# Patient Record
Sex: MALE | Race: ASIAN
[De-identification: names, ages, dates, MRNs, and addresses within clinical notes are randomized per-mention and may not be internally consistent; named-entity substitution may affect disease eponyms.]

---

## 2018-06-13 NOTE — NUR
JAGRUTI FROM HOME C/O ABD PAIN, N/V, ADMITS TO ETOH. PT WAS D/C FROM [place] FOR 
EOTH. PT VSS BUT MOANING AND RESTLESS WITH PAIN 7/10. SKIN IS INTACT BUT RED 
AND FLUSHED ON FACE AND ARMS. A/OX4 ABLE TO MAKE NEEDS KNOWN. WILL CONTINUE TO 
MONITOR FOR ANY CHANGES DURING THE SHIFT.

## 2018-06-13 NOTE — NUR
PT STATES "THE MEDICAITONS YOU GAVE ME ARE MAKING ME FEEL BETTER". ALSO 
SPEAKING ABOUT HIS HX HE STATES "I RELAPSDE ON ALCHOL. A WHILE AGO I WAS AN 
ALCOHOLIC AND DRANK TO THE POINT WHERE MY LIVER FAILED AND WAS WAITING FOR A 
NEW ONE BUT THEN IT HEALED."

## 2019-09-09 NOTE — NUR
VIVIAN FOR [facility] CALLED; PT HAS A BED AT [facility], SEND LATEST ETOH LEVEL AND 
NOTES FROM MD SAYING PT IS MEDICALLY CLEARED, VIVIAN WILL SETUP TRANSPORT, ETA 
WILL BE AROUND 3PM

## 2019-09-09 NOTE — NUR
BIB ,29 YEAR OLD MALE CALLED 911,FEELING SUICIDAL,THINKING OF JUMPING OFF 
A BRIDGE HAD "20 GLASSES OF WINE". ALERT AND ORIENTED X4, BREATHING EVEN AND 
UNLABORED WITH NO DISTRESS NOTED. + SI SITTER AT BEDSIDE. SKIN INTACT. WAITING 
TO BE SEEN BY MD. WILL CONTINUE TO MONITOR

## 2019-09-09 NOTE — NUR
JUDITH contacted [facility] and spoke with Stephanie in intake [phone] who informed JUDITH 
that they do have male beds available for today. JUDITH to fax clinicals. JUDITH spoke 
with MARIA EUGENIA Schuler requesting for a lab draw to check pt's alcohol level.

## 2019-11-16 NOTE — NUR
BIBRA 860 FROM HOME C/O SI "I WANT TO RUN THROUGH TRAFFIC", -HI. ON ROOM AIR, 
BREATHING EVENLY AND UNLABORED. KEPT COMFORTABLE, WILL CONTINUE TO MONITOR 
ACCORDINGLY. SITTER AT BEDSIDE FOR CONSTANT MONITORING.

## 2019-11-17 NOTE — NUR
followed up with [facility]. they requested to have clinicals faxed over 
again. will fax clinicals to intake.

## 2019-11-17 NOTE — NUR
pt denies SI/HI, wants to go home. pt does not have phone or wallet with him, 
but reports he is able to get into his apartment, requesting tap card. tap card 
provided. Patient discharged to home in stable condition. Written and verbal 
after care instructions given. Patient verbalizes understanding of instruction. 
ambulatory with steady gait. NAD noted. belongings returned to pt.

## 2020-02-09 ENCOUNTER — HOSPITAL ENCOUNTER (EMERGENCY)
Dept: HOSPITAL 12 - ER | Age: 30
Discharge: HOME | End: 2020-02-09
Payer: MEDICAID

## 2020-02-09 VITALS — DIASTOLIC BLOOD PRESSURE: 88 MMHG | SYSTOLIC BLOOD PRESSURE: 121 MMHG

## 2020-02-09 VITALS — WEIGHT: 175 LBS | HEIGHT: 67 IN | BODY MASS INDEX: 27.47 KG/M2

## 2020-02-09 DIAGNOSIS — F10.129: Primary | ICD-10-CM

## 2020-02-09 PROCEDURE — A4663 DIALYSIS BLOOD PRESSURE CUFF: HCPCS

## 2020-10-02 ENCOUNTER — HOSPITAL ENCOUNTER (EMERGENCY)
Dept: HOSPITAL 54 - ER | Age: 30
Discharge: HOME | End: 2020-10-02
Payer: MEDICAID

## 2020-10-02 VITALS — BODY MASS INDEX: 29.51 KG/M2 | WEIGHT: 188 LBS | HEIGHT: 67 IN

## 2020-10-02 VITALS — DIASTOLIC BLOOD PRESSURE: 76 MMHG | SYSTOLIC BLOOD PRESSURE: 132 MMHG

## 2020-10-02 DIAGNOSIS — F90.9: ICD-10-CM

## 2020-10-02 DIAGNOSIS — F32.9: ICD-10-CM

## 2020-10-02 DIAGNOSIS — R41.82: ICD-10-CM
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
## 2020-10-02 DIAGNOSIS — F10.10: Primary | ICD-10-CM

## 2020-10-02 LAB
ALBUMIN SERPL BCP-MCNC: 4 G/DL (ref 3.4–5)
ALP SERPL-CCNC: 55 U/L (ref 46–116)
ALT SERPL W P-5'-P-CCNC: 81 U/L (ref 12–78)
APAP SERPL-MCNC: 0 UG/ML (ref 10–30)
APPEARANCE UR: CLEAR
AST SERPL W P-5'-P-CCNC: 49 U/L (ref 15–37)
BASOPHILS # BLD AUTO: 0.1 /CMM (ref 0–0.2)
BASOPHILS NFR BLD AUTO: 1 % (ref 0–2)
BILIRUB DIRECT SERPL-MCNC: 0.1 MG/DL (ref 0–0.2)
BILIRUB SERPL-MCNC: 0.3 MG/DL (ref 0.2–1)
BILIRUB UR QL STRIP: NEGATIVE
BUN SERPL-MCNC: 14 MG/DL (ref 7–18)
CALCIUM SERPL-MCNC: 8.4 MG/DL (ref 8.5–10.1)
CHLORIDE SERPL-SCNC: 103 MMOL/L (ref 98–107)
CO2 SERPL-SCNC: 24 MMOL/L (ref 21–32)
COLOR UR: YELLOW
CREAT SERPL-MCNC: 1 MG/DL (ref 0.6–1.3)
EOSINOPHIL NFR BLD AUTO: 8.3 % (ref 0–6)
ETHANOL SERPL-MCNC: 316 MG/DL (ref 0–0)
GLUCOSE SERPL-MCNC: 110 MG/DL (ref 74–106)
GLUCOSE UR STRIP-MCNC: NEGATIVE MG/DL
HCT VFR BLD AUTO: 43 % (ref 39–51)
HGB BLD-MCNC: 14.5 G/DL (ref 13.5–17.5)
HGB UR QL STRIP: NEGATIVE ERY/UL
KETONES UR STRIP-MCNC: NEGATIVE MG/DL
LEUKOCYTE ESTERASE UR QL STRIP: NEGATIVE
LYMPHOCYTES NFR BLD AUTO: 2.9 /CMM (ref 0.8–4.8)
LYMPHOCYTES NFR BLD AUTO: 32.2 % (ref 20–44)
MCHC RBC AUTO-ENTMCNC: 34 G/DL (ref 31–36)
MCV RBC AUTO: 91 FL (ref 80–96)
MONOCYTES NFR BLD AUTO: 0.5 /CMM (ref 0.1–1.3)
MONOCYTES NFR BLD AUTO: 5.8 % (ref 2–12)
NEUTROPHILS # BLD AUTO: 4.8 /CMM (ref 1.8–8.9)
NEUTROPHILS NFR BLD AUTO: 52.7 % (ref 43–81)
NITRITE UR QL STRIP: NEGATIVE
PH UR STRIP: 6 [PH] (ref 5–8)
PLATELET # BLD AUTO: 182 /CMM (ref 150–450)
POTASSIUM SERPL-SCNC: 3.6 MMOL/L (ref 3.5–5.1)
PROT SERPL-MCNC: 7.3 G/DL (ref 6.4–8.2)
PROT UR QL STRIP: NEGATIVE MG/DL
RBC # BLD AUTO: 4.72 MIL/UL (ref 4.5–6)
SALICYLATES SERPL-MCNC: 2 MG/DL (ref 2.8–20)
SODIUM SERPL-SCNC: 141 MMOL/L (ref 136–145)
UROBILINOGEN UR STRIP-MCNC: 0.2 EU/DL
WBC NRBC COR # BLD AUTO: 9.1 K/UL (ref 4.3–11)

## 2020-10-02 PROCEDURE — 80076 HEPATIC FUNCTION PANEL: CPT

## 2020-10-02 PROCEDURE — 70450 CT HEAD/BRAIN W/O DYE: CPT

## 2020-10-02 PROCEDURE — 80307 DRUG TEST PRSMV CHEM ANLYZR: CPT

## 2020-10-02 PROCEDURE — 80048 BASIC METABOLIC PNL TOTAL CA: CPT

## 2020-10-02 PROCEDURE — G0480 DRUG TEST DEF 1-7 CLASSES: HCPCS

## 2020-10-02 PROCEDURE — 85025 COMPLETE CBC W/AUTO DIFF WBC: CPT

## 2020-10-02 PROCEDURE — 36415 COLL VENOUS BLD VENIPUNCTURE: CPT

## 2020-10-02 PROCEDURE — 99285 EMERGENCY DEPT VISIT HI MDM: CPT

## 2020-10-02 PROCEDURE — 80329 ANALGESICS NON-OPIOID 1 OR 2: CPT

## 2020-10-02 PROCEDURE — 81001 URINALYSIS AUTO W/SCOPE: CPT

## 2020-10-02 PROCEDURE — 80305 DRUG TEST PRSMV DIR OPT OBS: CPT

## 2020-10-02 RX ADMIN — CHLORDIAZEPOXIDE HYDROCHLORIDE ONE MG: 25 CAPSULE ORAL at 12:00

## 2020-10-02 RX ADMIN — FAMOTIDINE ONE MG: 20 TABLET, FILM COATED ORAL at 12:00

## 2020-10-02 RX ADMIN — ALUMINUM HYDROXIDE, MAGNESIUM HYDROXIDE, AND SIMETHICONE ONE ML: 200; 200; 20 SUSPENSION ORAL at 12:00

## 2020-10-02 NOTE — NUR
PT BIBA FOUND LYING IN THE MIDDLE OF THE STREET FOR ALTERED AND POSSIBLE ETOH. 
PT AAOX1, VSS, RESPIRATIONS EVEN AND UNLABORED ON RA W/ NAD NOTED. PT CONNECTED 
TO THE CARDIAC MONITOR AND POX.

## 2020-10-02 NOTE — NUR
pt is awake in bed. not in any resp distress. ambulated from bed to bathroom on 
steady gait w/o any assist. md made aware